# Patient Record
Sex: FEMALE | Race: ASIAN | NOT HISPANIC OR LATINO | URBAN - METROPOLITAN AREA
[De-identification: names, ages, dates, MRNs, and addresses within clinical notes are randomized per-mention and may not be internally consistent; named-entity substitution may affect disease eponyms.]

---

## 2019-05-26 ENCOUNTER — EMERGENCY (EMERGENCY)
Facility: HOSPITAL | Age: 19
LOS: 1 days | Discharge: HOME | End: 2019-05-26
Attending: EMERGENCY MEDICINE | Admitting: EMERGENCY MEDICINE
Payer: MEDICAID

## 2019-05-26 VITALS
DIASTOLIC BLOOD PRESSURE: 86 MMHG | HEART RATE: 81 BPM | OXYGEN SATURATION: 100 % | RESPIRATION RATE: 18 BRPM | SYSTOLIC BLOOD PRESSURE: 130 MMHG | TEMPERATURE: 99 F

## 2019-05-26 VITALS
DIASTOLIC BLOOD PRESSURE: 61 MMHG | RESPIRATION RATE: 18 BRPM | OXYGEN SATURATION: 100 % | TEMPERATURE: 98 F | SYSTOLIC BLOOD PRESSURE: 94 MMHG | HEART RATE: 78 BPM

## 2019-05-26 LAB
ALBUMIN SERPL ELPH-MCNC: 4.3 G/DL — SIGNIFICANT CHANGE UP (ref 3.3–5)
ALP SERPL-CCNC: 30 U/L — LOW (ref 40–120)
ALT FLD-CCNC: 4 U/L — SIGNIFICANT CHANGE UP (ref 4–33)
ANION GAP SERPL CALC-SCNC: 14 MMO/L — SIGNIFICANT CHANGE UP (ref 7–14)
APPEARANCE UR: SIGNIFICANT CHANGE UP
AST SERPL-CCNC: 16 U/L — SIGNIFICANT CHANGE UP (ref 4–32)
BASOPHILS # BLD AUTO: 0.03 K/UL — SIGNIFICANT CHANGE UP (ref 0–0.2)
BASOPHILS NFR BLD AUTO: 0.4 % — SIGNIFICANT CHANGE UP (ref 0–2)
BILIRUB SERPL-MCNC: 0.3 MG/DL — SIGNIFICANT CHANGE UP (ref 0.2–1.2)
BILIRUB UR-MCNC: NEGATIVE — SIGNIFICANT CHANGE UP
BLD GP AB SCN SERPL QL: NEGATIVE — SIGNIFICANT CHANGE UP
BLOOD UR QL VISUAL: NEGATIVE — SIGNIFICANT CHANGE UP
BUN SERPL-MCNC: 5 MG/DL — LOW (ref 7–23)
CALCIUM SERPL-MCNC: 9.7 MG/DL — SIGNIFICANT CHANGE UP (ref 8.4–10.5)
CHLORIDE SERPL-SCNC: 104 MMOL/L — SIGNIFICANT CHANGE UP (ref 98–107)
CO2 SERPL-SCNC: 22 MMOL/L — SIGNIFICANT CHANGE UP (ref 22–31)
COLOR SPEC: YELLOW — SIGNIFICANT CHANGE UP
CREAT SERPL-MCNC: 0.54 MG/DL — SIGNIFICANT CHANGE UP (ref 0.5–1.3)
EOSINOPHIL # BLD AUTO: 0.04 K/UL — SIGNIFICANT CHANGE UP (ref 0–0.5)
EOSINOPHIL NFR BLD AUTO: 0.5 % — SIGNIFICANT CHANGE UP (ref 0–6)
GLUCOSE SERPL-MCNC: 79 MG/DL — SIGNIFICANT CHANGE UP (ref 70–99)
GLUCOSE UR-MCNC: NEGATIVE — SIGNIFICANT CHANGE UP
HCG SERPL-ACNC: SIGNIFICANT CHANGE UP MIU/ML
HCT VFR BLD CALC: 38.4 % — SIGNIFICANT CHANGE UP (ref 34.5–45)
HGB BLD-MCNC: 12.4 G/DL — SIGNIFICANT CHANGE UP (ref 11.5–15.5)
IMM GRANULOCYTES NFR BLD AUTO: 0.4 % — SIGNIFICANT CHANGE UP (ref 0–1.5)
KETONES UR-MCNC: NEGATIVE — SIGNIFICANT CHANGE UP
LEUKOCYTE ESTERASE UR-ACNC: NEGATIVE — SIGNIFICANT CHANGE UP
LG PLATELETS BLD QL AUTO: SLIGHT — SIGNIFICANT CHANGE UP
LYMPHOCYTES # BLD AUTO: 1.67 K/UL — SIGNIFICANT CHANGE UP (ref 1–3.3)
LYMPHOCYTES # BLD AUTO: 22.8 % — SIGNIFICANT CHANGE UP (ref 13–44)
MCHC RBC-ENTMCNC: 27.8 PG — SIGNIFICANT CHANGE UP (ref 27–34)
MCHC RBC-ENTMCNC: 32.3 % — SIGNIFICANT CHANGE UP (ref 32–36)
MCV RBC AUTO: 86.1 FL — SIGNIFICANT CHANGE UP (ref 80–100)
MONOCYTES # BLD AUTO: 0.59 K/UL — SIGNIFICANT CHANGE UP (ref 0–0.9)
MONOCYTES NFR BLD AUTO: 8 % — SIGNIFICANT CHANGE UP (ref 2–14)
MUCOUS THREADS # UR AUTO: SIGNIFICANT CHANGE UP
NEUTROPHILS # BLD AUTO: 4.97 K/UL — SIGNIFICANT CHANGE UP (ref 1.8–7.4)
NEUTROPHILS NFR BLD AUTO: 67.9 % — SIGNIFICANT CHANGE UP (ref 43–77)
NITRITE UR-MCNC: NEGATIVE — SIGNIFICANT CHANGE UP
NRBC # FLD: 0.02 K/UL — SIGNIFICANT CHANGE UP (ref 0–0)
PH UR: 8 — SIGNIFICANT CHANGE UP (ref 5–8)
PLATELET # BLD AUTO: 99 K/UL — LOW (ref 150–400)
PLATELET CLUMP BLD QL SMEAR: SLIGHT — SIGNIFICANT CHANGE UP
PLATELET COUNT - ESTIMATE: SIGNIFICANT CHANGE UP
PMV BLD: SIGNIFICANT CHANGE UP FL (ref 7–13)
POTASSIUM SERPL-MCNC: 4.2 MMOL/L — SIGNIFICANT CHANGE UP (ref 3.5–5.3)
POTASSIUM SERPL-SCNC: 4.2 MMOL/L — SIGNIFICANT CHANGE UP (ref 3.5–5.3)
PROT SERPL-MCNC: 7.5 G/DL — SIGNIFICANT CHANGE UP (ref 6–8.3)
PROT UR-MCNC: 30 — SIGNIFICANT CHANGE UP
RBC # BLD: 4.46 M/UL — SIGNIFICANT CHANGE UP (ref 3.8–5.2)
RBC # FLD: 17.4 % — HIGH (ref 10.3–14.5)
RH IG SCN BLD-IMP: POSITIVE — SIGNIFICANT CHANGE UP
SODIUM SERPL-SCNC: 140 MMOL/L — SIGNIFICANT CHANGE UP (ref 135–145)
SP GR SPEC: 1.02 — SIGNIFICANT CHANGE UP (ref 1–1.04)
UROBILINOGEN FLD QL: NORMAL — SIGNIFICANT CHANGE UP
WBC # BLD: 7.33 K/UL — SIGNIFICANT CHANGE UP (ref 3.8–10.5)
WBC # FLD AUTO: 7.33 K/UL — SIGNIFICANT CHANGE UP (ref 3.8–10.5)

## 2019-05-26 PROCEDURE — 99284 EMERGENCY DEPT VISIT MOD MDM: CPT

## 2019-05-26 PROCEDURE — 76830 TRANSVAGINAL US NON-OB: CPT | Mod: 26

## 2019-05-26 NOTE — ED ADULT TRIAGE NOTE - CHIEF COMPLAINT QUOTE
c/o lower abd pain radiating to the back onset 2 days, denies N/V fever or chills, denies burning on urination, 7 weeks pregnant, , denies spotting or vag bleeding.

## 2019-05-26 NOTE — ED PROVIDER NOTE - OBJECTIVE STATEMENT
17:57 Augie att: 19 @ 7 weeks c/o abd pain x 4 days. Past four days suprapubic abd cramping, constnat, rad to back. Constant nausea, infrequent nbnb emesis. Denies f, c, d, dysuria, vag bleed. OB Meena Nath

## 2019-05-28 LAB
BACTERIA UR CULT: SIGNIFICANT CHANGE UP
SPECIMEN SOURCE: SIGNIFICANT CHANGE UP

## 2020-01-07 ENCOUNTER — INPATIENT (INPATIENT)
Facility: HOSPITAL | Age: 20
LOS: 2 days | Discharge: HOME | End: 2020-01-10
Attending: SPECIALIST | Admitting: SPECIALIST
Payer: MEDICAID

## 2020-01-07 VITALS
OXYGEN SATURATION: 97 % | TEMPERATURE: 99 F | HEART RATE: 87 BPM | SYSTOLIC BLOOD PRESSURE: 127 MMHG | DIASTOLIC BLOOD PRESSURE: 70 MMHG | RESPIRATION RATE: 16 BRPM | HEIGHT: 55 IN | WEIGHT: 158.73 LBS

## 2020-01-07 DIAGNOSIS — O48.0 POST-TERM PREGNANCY: ICD-10-CM

## 2020-01-07 PROBLEM — Z78.9 OTHER SPECIFIED HEALTH STATUS: Chronic | Status: ACTIVE | Noted: 2019-05-26

## 2020-01-07 LAB
APTT BLD: 26.3 SEC — LOW (ref 27.5–36.3)
BASOPHILS # BLD AUTO: 0.02 K/UL — SIGNIFICANT CHANGE UP (ref 0–0.2)
BASOPHILS NFR BLD AUTO: 0.3 % — SIGNIFICANT CHANGE UP (ref 0–2)
BLD GP AB SCN SERPL QL: NEGATIVE — SIGNIFICANT CHANGE UP
EOSINOPHIL # BLD AUTO: 0.04 K/UL — SIGNIFICANT CHANGE UP (ref 0–0.5)
EOSINOPHIL NFR BLD AUTO: 0.6 % — SIGNIFICANT CHANGE UP (ref 0–6)
HCT VFR BLD CALC: 38 % — SIGNIFICANT CHANGE UP (ref 34.5–45)
HGB BLD-MCNC: 12.3 G/DL — SIGNIFICANT CHANGE UP (ref 11.5–15.5)
IMM GRANULOCYTES NFR BLD AUTO: 1.1 % — SIGNIFICANT CHANGE UP (ref 0–1.5)
INR BLD: 0.98 — SIGNIFICANT CHANGE UP (ref 0.88–1.17)
LYMPHOCYTES # BLD AUTO: 1.54 K/UL — SIGNIFICANT CHANGE UP (ref 1–3.3)
LYMPHOCYTES # BLD AUTO: 21.3 % — SIGNIFICANT CHANGE UP (ref 13–44)
MCHC RBC-ENTMCNC: 29.4 PG — SIGNIFICANT CHANGE UP (ref 27–34)
MCHC RBC-ENTMCNC: 32.4 % — SIGNIFICANT CHANGE UP (ref 32–36)
MCV RBC AUTO: 90.9 FL — SIGNIFICANT CHANGE UP (ref 80–100)
MONOCYTES # BLD AUTO: 0.74 K/UL — SIGNIFICANT CHANGE UP (ref 0–0.9)
MONOCYTES NFR BLD AUTO: 10.2 % — SIGNIFICANT CHANGE UP (ref 2–14)
NEUTROPHILS # BLD AUTO: 4.81 K/UL — SIGNIFICANT CHANGE UP (ref 1.8–7.4)
NEUTROPHILS NFR BLD AUTO: 66.5 % — SIGNIFICANT CHANGE UP (ref 43–77)
NRBC # FLD: 0 K/UL — SIGNIFICANT CHANGE UP (ref 0–0)
PLATELET # BLD AUTO: 64 K/UL — LOW (ref 150–400)
PMV BLD: SIGNIFICANT CHANGE UP FL (ref 7–13)
PROTHROM AB SERPL-ACNC: 11.2 SEC — SIGNIFICANT CHANGE UP (ref 9.8–13.1)
RBC # BLD: 4.18 M/UL — SIGNIFICANT CHANGE UP (ref 3.8–5.2)
RBC # FLD: 18.3 % — HIGH (ref 10.3–14.5)
REVIEW TO FOLLOW: YES — SIGNIFICANT CHANGE UP
RH IG SCN BLD-IMP: POSITIVE — SIGNIFICANT CHANGE UP
WBC # BLD: 7.23 K/UL — SIGNIFICANT CHANGE UP (ref 3.8–10.5)
WBC # FLD AUTO: 7.23 K/UL — SIGNIFICANT CHANGE UP (ref 3.8–10.5)

## 2020-01-07 RX ORDER — CITRIC ACID/SODIUM CITRATE 300-500 MG
15 SOLUTION, ORAL ORAL EVERY 6 HOURS
Refills: 0 | Status: DISCONTINUED | OUTPATIENT
Start: 2020-01-07 | End: 2020-01-08

## 2020-01-07 RX ORDER — OXYTOCIN 10 UNIT/ML
333.33 VIAL (ML) INJECTION
Qty: 20 | Refills: 0 | Status: DISCONTINUED | OUTPATIENT
Start: 2020-01-07 | End: 2020-01-08

## 2020-01-07 RX ORDER — HYDROCORTISONE 20 MG
50 TABLET ORAL EVERY 8 HOURS
Refills: 0 | Status: DISCONTINUED | OUTPATIENT
Start: 2020-01-07 | End: 2020-01-08

## 2020-01-07 RX ORDER — AMPICILLIN TRIHYDRATE 250 MG
2 CAPSULE ORAL ONCE
Refills: 0 | Status: COMPLETED | OUTPATIENT
Start: 2020-01-07 | End: 2020-01-07

## 2020-01-07 RX ORDER — AMPICILLIN TRIHYDRATE 250 MG
1 CAPSULE ORAL EVERY 4 HOURS
Refills: 0 | Status: DISCONTINUED | OUTPATIENT
Start: 2020-01-07 | End: 2020-01-08

## 2020-01-07 RX ORDER — SODIUM CHLORIDE 9 MG/ML
1000 INJECTION, SOLUTION INTRAVENOUS
Refills: 0 | Status: DISCONTINUED | OUTPATIENT
Start: 2020-01-07 | End: 2020-01-08

## 2020-01-07 RX ADMIN — Medication 216 GRAM(S): at 14:59

## 2020-01-07 RX ADMIN — Medication 108 GRAM(S): at 23:54

## 2020-01-07 RX ADMIN — Medication 50 MILLIGRAM(S): at 21:44

## 2020-01-07 RX ADMIN — Medication 108 GRAM(S): at 19:36

## 2020-01-07 NOTE — OB PROVIDER IHI INDUCTION/AUGMENTATION NOTE - NS_CHECKALL_OBGYN_ALL_OB
H&P was completed/Contractions pattern was reviewed/Order was written/FHR was reviewed H&P was completed/Order was written/FHR was reviewed/Induction / Augmentation was discussed/Contractions pattern was reviewed

## 2020-01-07 NOTE — CHART NOTE - NSCHARTNOTEFT_GEN_A_CORE
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 Gustabo Blanco in [place] presents for planned induction.  Platelets today 64.  Patient was on 60 mg of prednisone daily.  Patient has already started induction, she may not deliver until tomorrow per OB but timing is unclear as it is her first baby.  Would check with anesthesia threshold for epidural.  However, IVIG or even stress dose steroids unlikely to raise platelets within 24 hours.  Would continue on oral prednisone throughout peripartum period for hx of ITP.  Full consult to follow in AM.  Please inform Dr. Blanco of patient's admission and above plan.    Discussed with Dr. Chaudhry, on call attending.    Kristine Lentz DO  Hematology/Oncology Fellow, PGY5  Pager: [phone]

## 2020-01-07 NOTE — OB PROVIDER H&P - HISTORY OF PRESENT ILLNESS
Pt is a 20y/o  EDC 19     at 41 weeks presenting to L&D for inductions of labor. Patient denies contractions, leaking fluid, vaginal bleeding, endorses good fetal movement. Prenatal course complicated ITP heme Dr. Avila has been on a prednisone. GBS pos. EFW 3200grams. Pt accepts all blood products.     Allergies:NKDA      Medhx: pt with ITP followed by Dr. Aristides Avila hematology   Obhx: current, SABx2  Gynhx: pt denies cysts, fibroids, abn paps,stds  Sxhx: pt denies  Psychx: pt denies  Sochx: pt denies etoh, tobacco, and drug use  Famhx: pt denies    PE:  T(C): --  HR: --  BP: --  RR: --  SpO2: --  Abd: gravid soft nontender  CV: rrr  VE:60/-3  Sono: cephalic presentation    A+P: pt is a P0 at 41+weeks presenting for IOL; GBS+, ITP  -admit to L&D  -amp for gbs ppx  -routine labs  -efm/toco  -PO cytotec  -IV hydration  -bicitra  -anesthesia consult    d/w Dr. Pham PETERS-[facility] Pt is a 20y/o  EDC 19     at 41 weeks presenting to L&D for inductions of labor. Patient denies contractions, leaking fluid, vaginal bleeding, endorses good fetal movement. Prenatal course complicated ITP heme Dr. Avila has been on a prednisone x 3 weeks. GBS pos. EFW 3200grams. Pt accepts all blood products.     Allergies:NKDA  Meds: Prednisone 60mg daily, pnv, fe      Medhx: pt with ITP followed by Dr. Aristides Avila hematology - placed on prednisone 3 weeks ago now on 60mg daily  Obhx: current, SABx2  Gynhx: pt denies cysts, fibroids, abn paps,stds  Sxhx: pt denies  Psychx: pt denies  Sochx: pt denies etoh, tobacco, and drug use  Famhx: pt denies    PE:  T(C): --  HR: --  BP: --  RR: --  SpO2: --  Abd: gravid soft nontender  CV: rrr  VE:/-3  Sono: cephalic presentation    A+P: pt is a P0 at 41+weeks presenting for IOL; GBS+, ITP  -admit to L&D  -amp for gbs ppx  -routine labs  -efm/toco  -PO cytotec  -IV hydration  -bicitra  -anesthesia consult    d/w Dr. Pham [facility]ison [facility]

## 2020-01-08 LAB
HBV SURFACE AG SER-ACNC: NEGATIVE — SIGNIFICANT CHANGE UP
HCT VFR BLD CALC: 41.9 % — SIGNIFICANT CHANGE UP (ref 34.5–45)
HGB BLD-MCNC: 13.2 G/DL — SIGNIFICANT CHANGE UP (ref 11.5–15.5)
HIV COMBO RESULT: SIGNIFICANT CHANGE UP
HIV1+2 AB SPEC QL: SIGNIFICANT CHANGE UP
MCHC RBC-ENTMCNC: 28.7 PG — SIGNIFICANT CHANGE UP (ref 27–34)
MCHC RBC-ENTMCNC: 31.5 % — LOW (ref 32–36)
MCV RBC AUTO: 91.1 FL — SIGNIFICANT CHANGE UP (ref 80–100)
NRBC # FLD: 0 K/UL — SIGNIFICANT CHANGE UP (ref 0–0)
PLATELET # BLD AUTO: 66 K/UL — LOW (ref 150–400)
PMV BLD: SIGNIFICANT CHANGE UP FL (ref 7–13)
RBC # BLD: 4.6 M/UL — SIGNIFICANT CHANGE UP (ref 3.8–5.2)
RBC # FLD: 18.6 % — HIGH (ref 10.3–14.5)
RUBV IGG SER-ACNC: 2.9 INDEX — SIGNIFICANT CHANGE UP
RUBV IGG SER-IMP: POSITIVE — SIGNIFICANT CHANGE UP
T PALLIDUM AB TITR SER: NEGATIVE — SIGNIFICANT CHANGE UP
T PALLIDUM AB TITR SER: NEGATIVE — SIGNIFICANT CHANGE UP
WBC # BLD: 8.5 K/UL — SIGNIFICANT CHANGE UP (ref 3.8–10.5)
WBC # FLD AUTO: 8.5 K/UL — SIGNIFICANT CHANGE UP (ref 3.8–10.5)

## 2020-01-08 PROCEDURE — 99223 1ST HOSP IP/OBS HIGH 75: CPT

## 2020-01-08 RX ORDER — OXYCODONE HYDROCHLORIDE 5 MG/1
5 TABLET ORAL ONCE
Refills: 0 | Status: DISCONTINUED | OUTPATIENT
Start: 2020-01-08 | End: 2020-01-10

## 2020-01-08 RX ORDER — NALOXONE HYDROCHLORIDE 4 MG/.1ML
0.1 SPRAY NASAL
Refills: 0 | Status: DISCONTINUED | OUTPATIENT
Start: 2020-01-08 | End: 2020-01-08

## 2020-01-08 RX ORDER — BENZOCAINE 10 %
1 GEL (GRAM) MUCOUS MEMBRANE EVERY 6 HOURS
Refills: 0 | Status: DISCONTINUED | OUTPATIENT
Start: 2020-01-08 | End: 2020-01-10

## 2020-01-08 RX ORDER — PRAMOXINE HYDROCHLORIDE 150 MG/15G
1 AEROSOL, FOAM RECTAL EVERY 4 HOURS
Refills: 0 | Status: DISCONTINUED | OUTPATIENT
Start: 2020-01-08 | End: 2020-01-10

## 2020-01-08 RX ORDER — IBUPROFEN 200 MG
600 TABLET ORAL EVERY 6 HOURS
Refills: 0 | Status: COMPLETED | OUTPATIENT
Start: 2020-01-08 | End: 2020-12-06

## 2020-01-08 RX ORDER — DIBUCAINE 1 %
1 OINTMENT (GRAM) RECTAL EVERY 6 HOURS
Refills: 0 | Status: DISCONTINUED | OUTPATIENT
Start: 2020-01-08 | End: 2020-01-10

## 2020-01-08 RX ORDER — KETOROLAC TROMETHAMINE 30 MG/ML
30 SYRINGE (ML) INJECTION ONCE
Refills: 0 | Status: DISCONTINUED | OUTPATIENT
Start: 2020-01-08 | End: 2020-01-08

## 2020-01-08 RX ORDER — GLYCERIN ADULT
1 SUPPOSITORY, RECTAL RECTAL AT BEDTIME
Refills: 0 | Status: DISCONTINUED | OUTPATIENT
Start: 2020-01-08 | End: 2020-01-10

## 2020-01-08 RX ORDER — MAGNESIUM HYDROXIDE 400 MG/1
30 TABLET, CHEWABLE ORAL
Refills: 0 | Status: DISCONTINUED | OUTPATIENT
Start: 2020-01-08 | End: 2020-01-10

## 2020-01-08 RX ORDER — AER TRAVELER 0.5 G/1
1 SOLUTION RECTAL; TOPICAL EVERY 4 HOURS
Refills: 0 | Status: DISCONTINUED | OUTPATIENT
Start: 2020-01-08 | End: 2020-01-10

## 2020-01-08 RX ORDER — SIMETHICONE 80 MG/1
80 TABLET, CHEWABLE ORAL EVERY 4 HOURS
Refills: 0 | Status: DISCONTINUED | OUTPATIENT
Start: 2020-01-08 | End: 2020-01-10

## 2020-01-08 RX ORDER — DIPHENHYDRAMINE HCL 50 MG
25 CAPSULE ORAL EVERY 6 HOURS
Refills: 0 | Status: DISCONTINUED | OUTPATIENT
Start: 2020-01-08 | End: 2020-01-10

## 2020-01-08 RX ORDER — FENTANYL CITRATE 50 UG/ML
30 INJECTION INTRAVENOUS
Refills: 0 | Status: DISCONTINUED | OUTPATIENT
Start: 2020-01-08 | End: 2020-01-08

## 2020-01-08 RX ORDER — SODIUM CHLORIDE 9 MG/ML
3 INJECTION INTRAMUSCULAR; INTRAVENOUS; SUBCUTANEOUS EVERY 8 HOURS
Refills: 0 | Status: DISCONTINUED | OUTPATIENT
Start: 2020-01-08 | End: 2020-01-10

## 2020-01-08 RX ORDER — TETANUS TOXOID, REDUCED DIPHTHERIA TOXOID AND ACELLULAR PERTUSSIS VACCINE, ADSORBED 5; 2.5; 8; 8; 2.5 [IU]/.5ML; [IU]/.5ML; UG/.5ML; UG/.5ML; UG/.5ML
0.5 SUSPENSION INTRAMUSCULAR ONCE
Refills: 0 | Status: DISCONTINUED | OUTPATIENT
Start: 2020-01-08 | End: 2020-01-10

## 2020-01-08 RX ORDER — ACETAMINOPHEN 500 MG
975 TABLET ORAL
Refills: 0 | Status: DISCONTINUED | OUTPATIENT
Start: 2020-01-08 | End: 2020-01-10

## 2020-01-08 RX ORDER — OXYTOCIN 10 UNIT/ML
333.33 VIAL (ML) INJECTION
Qty: 20 | Refills: 0 | Status: DISCONTINUED | OUTPATIENT
Start: 2020-01-08 | End: 2020-01-08

## 2020-01-08 RX ORDER — HYDROCORTISONE 1 %
1 OINTMENT (GRAM) TOPICAL EVERY 6 HOURS
Refills: 0 | Status: DISCONTINUED | OUTPATIENT
Start: 2020-01-08 | End: 2020-01-10

## 2020-01-08 RX ORDER — ONDANSETRON 8 MG/1
4 TABLET, FILM COATED ORAL EVERY 6 HOURS
Refills: 0 | Status: DISCONTINUED | OUTPATIENT
Start: 2020-01-08 | End: 2020-01-10

## 2020-01-08 RX ORDER — OXYCODONE HYDROCHLORIDE 5 MG/1
5 TABLET ORAL
Refills: 0 | Status: DISCONTINUED | OUTPATIENT
Start: 2020-01-08 | End: 2020-01-10

## 2020-01-08 RX ORDER — LANOLIN
1 OINTMENT (GRAM) TOPICAL EVERY 6 HOURS
Refills: 0 | Status: DISCONTINUED | OUTPATIENT
Start: 2020-01-08 | End: 2020-01-10

## 2020-01-08 RX ORDER — IBUPROFEN 200 MG
600 TABLET ORAL EVERY 6 HOURS
Refills: 0 | Status: DISCONTINUED | OUTPATIENT
Start: 2020-01-08 | End: 2020-01-10

## 2020-01-08 RX ADMIN — Medication 975 MILLIGRAM(S): at 11:14

## 2020-01-08 RX ADMIN — SODIUM CHLORIDE 125 MILLILITER(S): 9 INJECTION, SOLUTION INTRAVENOUS at 00:30

## 2020-01-08 RX ADMIN — Medication 1000 MILLIUNIT(S)/MIN: at 06:25

## 2020-01-08 RX ADMIN — SODIUM CHLORIDE 3 MILLILITER(S): 9 INJECTION INTRAMUSCULAR; INTRAVENOUS; SUBCUTANEOUS at 14:00

## 2020-01-08 RX ADMIN — Medication 108 GRAM(S): at 03:56

## 2020-01-08 RX ADMIN — Medication 20 MILLIGRAM(S): at 12:59

## 2020-01-08 RX ADMIN — SODIUM CHLORIDE 3 MILLILITER(S): 9 INJECTION INTRAMUSCULAR; INTRAVENOUS; SUBCUTANEOUS at 22:27

## 2020-01-08 RX ADMIN — Medication 975 MILLIGRAM(S): at 11:44

## 2020-01-08 RX ADMIN — Medication 20 MILLIGRAM(S): at 21:07

## 2020-01-08 RX ADMIN — Medication 50 MILLIGRAM(S): at 06:00

## 2020-01-08 RX ADMIN — Medication 1 TABLET(S): at 11:13

## 2020-01-08 NOTE — CHART NOTE - NSCHARTNOTEFT_GEN_A_CORE
R4 Note    Pt examined for cervical change. SROM@550a.    Vital Signs Last 24 Hrs  T(C): 36.7 (2020 00:40), Max: 37.2 (2020 17:51)  T(F): 98.06 (2020 00:40), Max: 98.9 (2020 17:51)  HR: 95 (2020 05:48) (73 - 100)  BP: 124/74 (2020 05:29) (114/55 - 147/92)  BP(mean): --  RR: 17 (2020 17:51) (16 - 17)  SpO2: 98% (2020 05:48) (96% - 99%)    VE: 9.5/100/0  EFM: 125bl/mod/+accels/occasional variable decels noted (though pt sitting on room floor at this time)  Randall: q1-2min    IOL for LT, c/b ITP  -anticipate   -stress dose steroids  -efm/toco    d/w Dr. Henrry Rodriguez, pgy4

## 2020-01-08 NOTE — CONSULT NOTE ADULT - ASSESSMENT
19F 41 weeks pregnant present for induction of labor. Her  course has been complicated by ITP per documentation and she is on prednisone. Her current platelet count is in the 60s which is adequate for both vaginal and  delivery.    ITP  - c/w prednisone 60mg daily peripartum  - continue to check daily platelet count  - monitor for any signs or symptoms of bleeding, can transfuse PLT if this occurs and can consider IVIG at that time (if platelet count falls)  - if platelets count falls further can given IVIG, but this can take 24 hours to exert its effects  - typically a count > 75-80k is required for epidural placement (defer to anesthesia regarding our institutional guidelines), unfortunately IVIG would not raise the platelet count quickly enough for this to be safely placed at this time.   - coags wnl arguing against DIC, would check LFTs  - will attempt to discuss with patient's hematologist for more collateral    Adonay Ly, PGY6  Hematology Fellow  Pager: [phone] 19F 41 weeks pregnant present for induction of labor. Her  course has been complicated by ITP per documentation and she is on prednisone. Her current platelet count is in the 60s which is adequate for both vaginal and  delivery.    ITP  - c/w prednisone 60mg daily peripartum  - continue to check daily platelet count  - monitor for any signs or symptoms of bleeding. If patient requires  delivery and platelet count falls below 50k, can transfuse PLT will plan on administering IVIG at that time  - typically a count > 75-80k is required for epidural placement (defer to anesthesia regarding our institutional guidelines), unfortunately IVIG would not raise the platelet count quickly enough for this to be safely placed at this time.   - coags wnl arguing against DIC, would check LFTs  - will attempt to discuss with patient's hematologist for more collateral    Adonay Ly, PGY6  Hematology Fellow  Pager: [phone] 19F 41 weeks pregnant present for induction of labor. Her  course has been complicated by ITP per documentation and she is on prednisone. Her current platelet count is in the 60s which is adequate for both vaginal and  delivery.    ITP  - c/w prednisone 60mg daily peripartum  - continue to check daily platelet count  - monitor for any signs or symptoms of bleeding. If patient requires  delivery and platelet count falls below 50k, can transfuse PLT will plan on administering IVIG at that time  - typically a count > 75-80k is required for epidural placement (defer to anesthesia regarding our institutional guidelines), unfortunately IVIG would not raise the platelet count quickly enough for this to be safely placed at this time.   - coags wnl arguing against DIC, would check LFTs  - will attempt to discuss with patient's hematologist for more collateral  - anti glycoprotein antibodies can cross the placenta and could induce  thrombocytopenia and  platelet levels should be measured after birth and pediatrics should be made aware of this so that they can monitor per their protocol      Adonay Ly, PGY6  Hematology Fellow  Pager: [phone] 19F 41 weeks pregnant present for induction of labor. Her  course has been complicated by ITP per documentation and she is on prednisone. Her current platelet count is in the 60s which is adequate for both vaginal and  delivery.    ITP  - c/w prednisone per her outpatient hematologist's recommendations  - continue to check daily platelet count  - Patient had spontaneous uncomplicated vaginal delivery. Monitor for any excess bleeding.  - unfortunately due to her thrombocytopenia, she was high risk for epidural placement (typically PLT count of 75-80k is required) and unfortunately IVIG would not have raised the platelets quickly enough  - coags wnl arguing against DIC, would check LFTs for completeness  - attempted to reach patient's hematologist for collateral without success, primary team appears to be communicating with him  - anti glycoprotein antibodies can cross the placenta and could induce  thrombocytopenia and  platelet levels should be measured after birth and pediatrics should be made aware of this so that they can monitor per their protocol    Adonay Ly, PGY6  Hematology Fellow  Pager: [phone]

## 2020-01-08 NOTE — LACTATION INITIAL EVALUATION - LACTATION INTERVENTIONS
Pt. with hx of gestational ITP. Had concerns regarding taking prednisone while breastfeeding. Pt. with hx of gestational ITP. Had concerns regarding taking prednisone while breastfeeding. Pt. and RN informed and educated with regard to L2 status of medication. No contraindications indicated by [employer]. Decision to be made by pt. and peds based on information provided. Infant less than 24 hours old. Mom educated with regard to 1) babies less than 24 hours of age will be sleepy. 2) Made aware of cluster feeding that occurs after 24 hours of life and to be cautious of sleep deprivation in order to maintain infant and mother safety. Instructed to place infant in bassinet or call for assistance if feeling sleepy or tired. 3) Recognition of feeding cues and to feed the baby on demand based on cues at least 8-12 times in a day. Instructed pt. to wake the baby to feed if no feeding cues are seen within 3h since prior feed. 4) use  feeding log to record feedings along with wet and dirty diapers. Pt. informed of the accessibility of breastfeeding apps to document feedings along with wet and dirty diapers as another alternative for paper log. 5) instructed in hand expression with good return demonstration. + colostrum noted. Pt. informed of interactive learning luis available to use with the New Beginnings book. Interactive component demonstrated utilizing the section for hand expression. 6) Reviewed safe skin to skin. Shown wall poster for visual reinforcement of education. 7) Reviewed risks of supplementation. Verbalized understanding of education. Pt. informed of availability of lactation through the night and encouraged to call for assistance prn. RN made aware of plan and to assist with further feedings as necessary. Instructed to request assistance prn.

## 2020-01-08 NOTE — CHART NOTE - NSCHARTNOTEFT_GEN_A_CORE
Patient evaluated bedside for increased pain with contractions. Patient desires something for pain control.     T(C): 36.7 (00:40)  HR: 83 (05:06)  BP: 124/79 (05:06)  RR: 17 (17:51)  SpO2: 96% (00:19)    SVE: 5-6/90/-1  EFM: 125 BPM, moderate variability, possible decels versus maternal FH  [place]:  CTX q1-3min    A/P: Patient is a 20yo  at 41-1 ITP IOL. PLT 66. Unable to offer epidural 2/2 thrombocytopenia. For Anesthesia consult for PCA for pain control pending tracing.     Demetria Meehan, PGY2  D/W Dr. Sales

## 2020-01-08 NOTE — CHART NOTE - NSCHARTNOTEFT_GEN_A_CORE
Called patient's doctor,  to discuss plan of care for prednisone in the postpartum setting.   Patient to continue Prednisone 10 mg, 5 times a day. Patient to follow up with  on Monday, 1/13/2019 to decide further management.  Will communicate to RN and OBGYN resident team.     's cell number:

## 2020-01-08 NOTE — OB PROVIDER DELIVERY SUMMARY - NSPROVIDERDELIVERYNOTE_OBGYN_ALL_OB_FT
Spontaneous vaginal delivery of liveborn male infant from FREDA position. Head, shoulders, and body delivered easily. Infant was suctioned. Cord was clamped and cut and infant was passed to peds. Placenta delivered intact with a 3 vessel cord. Fundal massage was given and uterine fundus was found to be firm. Vaginal exam revealed an intact cervix, vaginal walls and sulci. Patient had a 2nd degree laceration in the perineum that was repaired with 2.0 chromic sutures. Excellent hemostasis was noted. Patient was stable and went to recovery. Count was correct x 2.

## 2020-01-08 NOTE — CONSULT NOTE ADULT - ATTENDING COMMENTS
Patient seen and examined, agree with above. Thrombocytopenia in pregnancy likely from ITP on steroids, successful vaginal delivery. We have recommended to check the baby's platelets as well as a very small percentage of babies born to mothers with ITP may present with thrombocytopenia at birth. Patient has scheduled a f/u with regular hematologist upon discharge.

## 2020-01-08 NOTE — OB RN DELIVERY SUMMARY - NS_SEPSISRSKCALC_OBGYN_ALL_OB_FT
No temperature has been documented for this patient in CPN or on the OB Flowsheet. Ensure the highest temperature during labor was documented on the OB Flowsheet.  No gestational age at birth has been documented. Ensure delivery date/time has been entered above.  Rupture of membranes must be entered above. EOS calculated successfully. EOS Risk Factor: 0.5/1000 live births ([facility] national incidence); GA=41w1d; Temp=98.8; ROM=0.6; GBS='Positive'; Antibiotics='Broad spectrum antibiotics > 4 hrs prior to birth'

## 2020-01-08 NOTE — CONSULT NOTE ADULT - SUBJECTIVE AND OBJECTIVE BOX
HPI:    Patient is a 19 year old female 41 weeks pregnant who presents for induction of labor. Her  course was complicated by ITP per EHR. She sees Dr. Avila, who does not come to [facility]. Patient has been on prednisone 60mg daily for the last 3 weeks. Here her platelet count is 64, with repeat measurement of 66. The patient accepts blood product transfusion as needed. She denies any bleeding or bruising. Her only other medication is a prenatal vitamin.      Allergies    No Known Allergies    Intolerances        MEDICATIONS  (STANDING):  acetaminophen   Tablet .. 975 milliGRAM(s) Oral <User Schedule>  diphtheria/tetanus/pertussis (acellular) Vaccine (ADAcel) 0.5 milliLiter(s) IntraMuscular once  fentaNYL PCA (50 MICROgram(s)/mL) 30 milliLiter(s) PCA Continuous PCA Continuous  hydrocortisone sodium succinate Injectable 50 milliGRAM(s) IV Push every 8 hours  ibuprofen  Tablet. 600 milliGRAM(s) Oral every 6 hours  ketorolac   Injectable 30 milliGRAM(s) IV Push once  oxytocin Infusion 333.333 milliUNIT(s)/Min (1000 mL/Hr) IV Continuous <Continuous>  prenatal multivitamin 1 Tablet(s) Oral daily  sodium chloride 0.9% lock flush 3 milliLiter(s) IV Push every 8 hours    MEDICATIONS  (PRN):  benzocaine 20%/menthol 0.5% Spray 1 Spray(s) Topical every 6 hours PRN for Perineal discomfort  dibucaine 1% Ointment 1 Application(s) Topical every 6 hours PRN Perineal discomfort  diphenhydrAMINE 25 milliGRAM(s) Oral every 6 hours PRN Pruritus  glycerin Suppository - Adult 1 Suppository(s) Rectal at bedtime PRN Constipation  hydrocortisone 1% Cream 1 Application(s) Topical every 6 hours PRN Moderate Pain (4-6)  lanolin Ointment 1 Application(s) Topical every 6 hours PRN nipple soreness  magnesium hydroxide Suspension 30 milliLiter(s) Oral two times a day PRN Constipation  naloxone Injectable 0.1 milliGRAM(s) IV Push every 3 minutes PRN For ANY of the following changes in patient status:  A. RR LESS THAN 10 breaths per minute, B. Oxygen saturation LESS THAN 90%, C. Sedation score of 6  ondansetron Injectable 4 milliGRAM(s) IV Push every 6 hours PRN Nausea  oxyCODONE    IR 5 milliGRAM(s) Oral every 3 hours PRN Moderate to Severe Pain (4-10)  oxyCODONE    IR 5 milliGRAM(s) Oral once PRN Moderate to Severe Pain (4-10)  pramoxine 1%/zinc 5% Cream 1 Application(s) Topical every 4 hours PRN Moderate Pain (4-6)  simethicone 80 milliGRAM(s) Chew every 4 hours PRN Gas  witch hazel Pads 1 Application(s) Topical every 4 hours PRN Perineal discomfort      PAST MEDICAL & SURGICAL HISTORY:  Thrombocytopenia during pregnancy  No pertinent past medical history  No significant past surgical history      FAMILY HISTORY:  No pertinent family history in first degree relatives      SOCIAL HISTORY: No EtOH, no tobacco    REVIEW OF SYSTEMS:    CONSTITUTIONAL: No weakness, fevers or chills  EYES/ENT: No visual changes;  No vertigo or throat pain   NECK: No pain or stiffness  RESPIRATORY: No cough, wheezing, hemoptysis; No shortness of breath  CARDIOVASCULAR: No chest pain or palpitations  GASTROINTESTINAL: No abdominal or epigastric pain. No nausea, vomiting, or hematemesis; No diarrhea or constipation. No melena or hematochezia.  GENITOURINARY: No dysuria, frequency or hematuria  NEUROLOGICAL: No numbness or weakness  SKIN: No itching, burning, rashes, or lesions   All other review of systems is negative unless indicated above.    Height (cm): 2 ( @ :04)  Weight (kg): 72 ( @ :04)  BMI (kg/m2): 998271 ( @ :04)  BSA (m2): 0.07 ( @ :04)    T(F): 98.8 (20 @ 06:44), Max: 98.9 (20 @ 17:51)  HR: 120 (20 @ 07:43)  BP: 99/57 (20 @ 07:43)  RR: 17 (20 @ 17:51)  SpO2: 89% (20 @ 07:41)  Wt(kg): --    GENERAL: NAD, well-developed  HEAD:  Atraumatic, Normocephalic  EYES: EOMI, PERRLA, conjunctiva and sclera clear  NECK: Supple, No JVD  CHEST/LUNG: Clear to auscultation bilaterally; No wheeze  HEART: Regular rate and rhythm; No murmurs, rubs, or gallops  ABDOMEN: Soft, Nontender, Nondistended; Bowel sounds present  EXTREMITIES:  2+ Peripheral Pulses, No clubbing, cyanosis, or edema  NEUROLOGY: non-focal  SKIN: No rashes or lesions                          13.2   8.50  )-----------( 66       ( 2020 01:20 )             41.9 HPI:    Patient is a 19 year old female 41 weeks pregnant who presents for induction of labor. Her  course was complicated by ITP per EHR. She sees Dr. Avila, who does not come to [facility]. Patient has been on prednisone 60mg daily for the last 3 weeks. Here her platelet count is 64, with repeat measurement of 66. The patient accepts blood product transfusion as needed. She denies any bleeding or bruising. Her only other medication is a prenatal vitamin.    At the time of my evaluation the patient had already had a successful vaginal delivery. The team has spoken to the patient's hematologist who arranged follow up on Monday and the patient will continue with prednisone 50mg daily. I attempted to call the patients hematologist for collateral information.      Allergies    No Known Allergies    Intolerances        MEDICATIONS  (STANDING):  acetaminophen   Tablet .. 975 milliGRAM(s) Oral <User Schedule>  diphtheria/tetanus/pertussis (acellular) Vaccine (ADAcel) 0.5 milliLiter(s) IntraMuscular once  fentaNYL PCA (50 MICROgram(s)/mL) 30 milliLiter(s) PCA Continuous PCA Continuous  hydrocortisone sodium succinate Injectable 50 milliGRAM(s) IV Push every 8 hours  ibuprofen  Tablet. 600 milliGRAM(s) Oral every 6 hours  ketorolac   Injectable 30 milliGRAM(s) IV Push once  oxytocin Infusion 333.333 milliUNIT(s)/Min (1000 mL/Hr) IV Continuous <Continuous>  prenatal multivitamin 1 Tablet(s) Oral daily  sodium chloride 0.9% lock flush 3 milliLiter(s) IV Push every 8 hours    MEDICATIONS  (PRN):  benzocaine 20%/menthol 0.5% Spray 1 Spray(s) Topical every 6 hours PRN for Perineal discomfort  dibucaine 1% Ointment 1 Application(s) Topical every 6 hours PRN Perineal discomfort  diphenhydrAMINE 25 milliGRAM(s) Oral every 6 hours PRN Pruritus  glycerin Suppository - Adult 1 Suppository(s) Rectal at bedtime PRN Constipation  hydrocortisone 1% Cream 1 Application(s) Topical every 6 hours PRN Moderate Pain (4-6)  lanolin Ointment 1 Application(s) Topical every 6 hours PRN nipple soreness  magnesium hydroxide Suspension 30 milliLiter(s) Oral two times a day PRN Constipation  naloxone Injectable 0.1 milliGRAM(s) IV Push every 3 minutes PRN For ANY of the following changes in patient status:  A. RR LESS THAN 10 breaths per minute, B. Oxygen saturation LESS THAN 90%, C. Sedation score of 6  ondansetron Injectable 4 milliGRAM(s) IV Push every 6 hours PRN Nausea  oxyCODONE    IR 5 milliGRAM(s) Oral every 3 hours PRN Moderate to Severe Pain (4-10)  oxyCODONE    IR 5 milliGRAM(s) Oral once PRN Moderate to Severe Pain (4-10)  pramoxine 1%/zinc 5% Cream 1 Application(s) Topical every 4 hours PRN Moderate Pain (4-6)  simethicone 80 milliGRAM(s) Chew every 4 hours PRN Gas  witch hazel Pads 1 Application(s) Topical every 4 hours PRN Perineal discomfort      PAST MEDICAL & SURGICAL HISTORY:  Thrombocytopenia during pregnancy  No pertinent past medical history  No significant past surgical history      FAMILY HISTORY:  No pertinent family history in first degree relatives      SOCIAL HISTORY: No EtOH, no tobacco    REVIEW OF SYSTEMS:    CONSTITUTIONAL: No weakness, fevers or chills  EYES/ENT: No visual changes;  No vertigo or throat pain   NECK: No pain or stiffness  RESPIRATORY: No cough, wheezing, hemoptysis; No shortness of breath  CARDIOVASCULAR: No chest pain or palpitations  GASTROINTESTINAL: No abdominal or epigastric pain. No nausea, vomiting, or hematemesis; No diarrhea or constipation. No melena or hematochezia.  GENITOURINARY: No dysuria, frequency or hematuria  NEUROLOGICAL: No numbness or weakness  SKIN: No itching, burning, rashes, or lesions   All other review of systems is negative unless indicated above.    Height (cm): 2 (:)  Weight (kg): 72 (:)  BMI (kg/m2): 359547 (:)  BSA (m2): 0.07 (:)    T(F): 98.8 (20 @ 06:44), Max: 98.9 (20 @ 17:51)  HR: 120 (20 @ 07:43)  BP: 99/57 (20 07:43)  RR: 17 (20 @ 17:51)  SpO2: 89% (01-08-20 @ 07:41)  Wt(kg): --    GENERAL: NAD, well-developed  HEAD:  Atraumatic, Normocephalic  EYES: EOMI, PERRLA, conjunctiva and sclera clear  NECK: Supple, No JVD  CHEST/LUNG: Clear to auscultation bilaterally; No wheeze  HEART: Regular rate and rhythm; No murmurs, rubs, or gallops  ABDOMEN: Soft, Nontender, Nondistended; Bowel sounds present  EXTREMITIES:  2+ Peripheral Pulses, No clubbing, cyanosis, or edema  NEUROLOGY: non-focal  SKIN: No rashes or lesions                          13.2   8.50  )-----------( 66        01:20 )             41.9

## 2020-01-09 LAB
HCT VFR BLD CALC: 34.7 % — SIGNIFICANT CHANGE UP (ref 34.5–45)
HGB BLD-MCNC: 11.2 G/DL — LOW (ref 11.5–15.5)
MCHC RBC-ENTMCNC: 29.3 PG — SIGNIFICANT CHANGE UP (ref 27–34)
MCHC RBC-ENTMCNC: 32.3 % — SIGNIFICANT CHANGE UP (ref 32–36)
MCV RBC AUTO: 90.8 FL — SIGNIFICANT CHANGE UP (ref 80–100)
NRBC # FLD: 0 K/UL — SIGNIFICANT CHANGE UP (ref 0–0)
PLATELET # BLD AUTO: 69 K/UL — LOW (ref 150–400)
PMV BLD: 13.5 FL — HIGH (ref 7–13)
RBC # BLD: 3.82 M/UL — SIGNIFICANT CHANGE UP (ref 3.8–5.2)
RBC # FLD: 18.2 % — HIGH (ref 10.3–14.5)
WBC # BLD: 12.3 K/UL — HIGH (ref 3.8–10.5)
WBC # FLD AUTO: 12.3 K/UL — HIGH (ref 3.8–10.5)

## 2020-01-09 RX ADMIN — Medication 20 MILLIGRAM(S): at 15:03

## 2020-01-09 RX ADMIN — Medication 20 MILLIGRAM(S): at 07:01

## 2020-01-09 RX ADMIN — SODIUM CHLORIDE 3 MILLILITER(S): 9 INJECTION INTRAMUSCULAR; INTRAVENOUS; SUBCUTANEOUS at 14:59

## 2020-01-09 RX ADMIN — Medication 20 MILLIGRAM(S): at 23:13

## 2020-01-09 RX ADMIN — SODIUM CHLORIDE 3 MILLILITER(S): 9 INJECTION INTRAMUSCULAR; INTRAVENOUS; SUBCUTANEOUS at 06:58

## 2020-01-10 VITALS
RESPIRATION RATE: 17 BRPM | TEMPERATURE: 98 F | HEART RATE: 67 BPM | DIASTOLIC BLOOD PRESSURE: 61 MMHG | SYSTOLIC BLOOD PRESSURE: 114 MMHG | OXYGEN SATURATION: 96 %

## 2020-01-10 RX ORDER — IBUPROFEN 200 MG
1 TABLET ORAL
Qty: 0 | Refills: 0 | DISCHARGE
Start: 2020-01-10

## 2020-01-10 RX ADMIN — Medication 1 TABLET(S): at 11:27

## 2020-01-10 RX ADMIN — Medication 975 MILLIGRAM(S): at 10:21

## 2020-01-10 RX ADMIN — Medication 20 MILLIGRAM(S): at 06:32

## 2020-01-10 RX ADMIN — Medication 975 MILLIGRAM(S): at 09:31

## 2020-01-10 NOTE — DISCHARGE NOTE OB - PATIENT PORTAL LINK FT
You can access the FollowMyHealth Patient Portal offered by [facility] by registering at the following website: [email or web address]. By joining [facility]’s FollowMyHealth portal, you will also be able to view your health information using other applications (apps) compatible with our system.

## 2020-01-10 NOTE — DISCHARGE NOTE OB - CARE PROVIDERS DIRECT ADDRESSES
Chief Complaint   Patient presents with   • Elevated Hepatic Enzymes     AST: 48, ALT: 45     Mami Srivastava is a 71 y.o. female who presents with a History of elevated liver enzymes and an indeterminant hepatitis A antibody level   HPI     Patient 71-year-old female with history of hypertension and hypothyroidism found with mildly elevated LFTs.  Patient under evaluation found with an indeterminant hepatitis A antibody level.  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  Patient's liver enzymes however are minimal and not typical for hepatitis A infection.  Patient denies abdominal pain no nausea vomiting no flulike symptoms.  Patient denies jaundice or darkening of the urine or lightening of the stool.  Patient now here for further assessment.    Past Medical History:   Diagnosis Date   • Cholelithiasis 04-    Ultrasound   • Coronary artery disease     Dr Luis Rick   • Hernia 2017    Hiatal hernia-Prabhjot test   • Hyperlipidemia     Dr Luis Rick-atorvastatin   • Hypertension    • Hyperthyroidism    • Hypothyroidism    • Skin cancer     face   • Ulcerative colitis 2014    Colitis/Arben       Current Outpatient Prescriptions:   •  alendronate (FOSAMAX) 70 MG tablet, Take 1 tablet by mouth Every 7 (Seven) Days. For Osteopenia, Disp: 16 tablet, Rfl: 3  •  aspirin 81 MG EC tablet, Take 81 mg by mouth Daily., Disp: , Rfl:   •  cetirizine (zyrTEC) 10 MG tablet, Take 10 mg by mouth Daily., Disp: , Rfl:   •  levothyroxine (SYNTHROID, LEVOTHROID) 75 MCG tablet, Take 1 tablet by mouth Daily. For thyroid (generic accepted), Disp: 90 tablet, Rfl: 3  •  lisinopril-hydrochlorothiazide (PRINZIDE,ZESTORETIC) 10-12.5 MG per tablet, Take 1 tablet by mouth Daily. For BP, Disp: 90 tablet, Rfl: 1  •  Multiple Vitamin (MULTI VITAMIN DAILY PO), Take  by mouth., Disp: , Rfl:   •  atorvastatin (LIPITOR) 40 MG tablet, Take 1 tablet  by mouth Daily. For cholesterol, Disp: 90 tablet, Rfl: 3  Allergies   Allergen Reactions   • Augmentin [Amoxicillin-Pot Clavulanate] Hives   • Keflex [Cephalexin] Hives     Social History     Social History   • Marital status: Single     Spouse name: N/A   • Number of children: N/A   • Years of education: N/A     Occupational History   • Not on file.     Social History Main Topics   • Smoking status: Former Smoker     Packs/day: 1.50     Years: 10.00     Start date: 1963     Quit date: 1976   • Smokeless tobacco: Never Used      Comment: Chrinic bronchitis...smoke allergy   • Alcohol use Yes      Comment: Seldom   • Drug use: No   • Sexual activity: Not Currently     Other Topics Concern   • Not on file     Social History Narrative   • No narrative on file     Family History   Problem Relation Age of Onset   • Heart disease Mother    • Hypertension Mother    • Lung cancer Sister    • Thyroid disease Sister    • Lupus Sister    • Thyroid disease Brother    • Alcohol abuse Brother    • Glaucoma Maternal Grandmother    • Stomach cancer Maternal Grandmother          • Cirrhosis Father            Review of Systems   Constitutional: Negative.    HENT: Negative.    Eyes: Negative.    Respiratory: Negative.    Cardiovascular: Negative.    Gastrointestinal: Negative.    Endocrine: Negative.    Musculoskeletal: Negative.    Skin: Negative.    Allergic/Immunologic: Positive for environmental allergies. Negative for food allergies and immunocompromised state.   Hematological: Negative.      Vitals:    18 1605   BP: 142/82   Temp: 97.8 °F (36.6 °C)     Physical Exam   Constitutional: She is oriented to person, place, and time. She appears well-developed and well-nourished.   HENT:   Head: Normocephalic and atraumatic.   Eyes: Pupils are equal, round, and reactive to light. No scleral icterus.   Neck: Normal range of motion.   Cardiovascular: Normal rate, regular rhythm and normal heart sounds.   Exam reveals no gallop and no friction rub.    No murmur heard.  Pulmonary/Chest: Effort normal and breath sounds normal. She has no wheezes. She has no rales.   Abdominal: Soft. Bowel sounds are normal. She exhibits no shifting dullness, no distension, no pulsatile liver, no fluid wave, no abdominal bruit, no ascites, no pulsatile midline mass and no mass. There is no hepatosplenomegaly. There is no tenderness. There is no rigidity and no guarding. No hernia.   Musculoskeletal: Normal range of motion. She exhibits no edema.   Lymphadenopathy:     She has no cervical adenopathy.   Neurological: She is alert and oriented to person, place, and time. No cranial nerve deficit.   Skin: Skin is warm and dry. No rash noted.   Psychiatric: She has a normal mood and affect. Her behavior is normal.   Nursing note and vitals reviewed.    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  Currently patient not allowed to return to work until cleared the possibility of hepatitis A.  Patient reports no GI complaints denies any abdominal pain no right upper quadrant pain does have some left back pain and medication but unrelated to eating her bowels.  At this point will check total hepatitis A antibody levels as this may be a case where the the IgG is partially interacting with the IgM evaluation that would indicate patient is new and not infected.  We'll also check serologies for other causes of mild elevated liver test abnormalities and include hepatitis B antibody  as she may be interested in getting both a and B vaccine if she is not immune.  We'll follow up based on results of blood tests.   ,DirectAddress_Unknown

## 2020-01-10 NOTE — PROGRESS NOTE ADULT - SUBJECTIVE AND OBJECTIVE BOX
Patient assessed at 0906.  Subjective  Pain: Patient denies any pain at the time of assessment. Pain being managed well by pain management protocol  Complaints:  None. Patient denies any bruising. Patient reports she has been followed by hematologist due to ITP on prednisone.   Milestones: Alert and orientedx3. Out of bed ambulating. Voiding. Tolerating a regular diet.  Infant feeding:    breastfeeding and formula feeding                       Feeding related issues or concerns: none      Objective   T(C): 36.6 (01-10-20 @ 05:48), Max: 36.7 (20 @ 17:54)  HR: 67 (01-10-20 @ 05:48) (67 - 81)  BP: 114/61 (01-10-20 @ 05:48) (114/61 - 127/69)  RR: 17 (01-10-20 @ 05:48) (16 - 17)  SpO2: 96% (01-10-20 @ 05:48) (96% - 98%)  Wt(kg): --    LABS:                          11.2   12.30 )-----------( 69       ( 2020 06:00 )             34.7                         13.2   8.50  )-----------( 66       ( 2020 01:20 )             41.9                         12.3   7.23  )-----------( 64       ( 2020 14:45 )             38.0       Blood Type: B Positive      Treponema Pallidum Antibody Interpretation: Negative ( @ 01:20)  Treponema Pallidum Antibody Interpretation: Negative ( @ 14:45)      Rubella IgG Interp: Positive ( @ 01:20)     MEDICATIONS  (STANDING):  acetaminophen   Tablet .. 975 milliGRAM(s) Oral <User Schedule>  diphtheria/tetanus/pertussis (acellular) Vaccine (ADAcel) 0.5 milliLiter(s) IntraMuscular once  ibuprofen  Tablet. 600 milliGRAM(s) Oral every 6 hours  predniSONE   Tablet 20 milliGRAM(s) Oral three times a day  prenatal multivitamin 1 Tablet(s) Oral daily  sodium chloride 0.9% lock flush 3 milliLiter(s) IV Push every 8 hours    MEDICATIONS  (PRN):  benzocaine 20%/menthol 0.5% Spray 1 Spray(s) Topical every 6 hours PRN for Perineal discomfort  dibucaine 1% Ointment 1 Application(s) Topical every 6 hours PRN Perineal discomfort  diphenhydrAMINE 25 milliGRAM(s) Oral every 6 hours PRN Pruritus  glycerin Suppository - Adult 1 Suppository(s) Rectal at bedtime PRN Constipation  hydrocortisone 1% Cream 1 Application(s) Topical every 6 hours PRN Moderate Pain (4-6)  lanolin Ointment 1 Application(s) Topical every 6 hours PRN nipple soreness  magnesium hydroxide Suspension 30 milliLiter(s) Oral two times a day PRN Constipation  ondansetron Injectable 4 milliGRAM(s) IV Push every 6 hours PRN Nausea  oxyCODONE    IR 5 milliGRAM(s) Oral every 3 hours PRN Moderate to Severe Pain (4-10)  oxyCODONE    IR 5 milliGRAM(s) Oral once PRN Moderate to Severe Pain (4-10)  pramoxine 1%/zinc 5% Cream 1 Application(s) Topical every 4 hours PRN Moderate Pain (4-6)  simethicone 80 milliGRAM(s) Chew every 4 hours PRN Gas  witch hazel Pads 1 Application(s) Topical every 4 hours PRN Perineal discomfort      ASSESSMENT:   20y/o . Day #2  postpartum .  Past medical/surgical/OB/GYN history significant for ITP f/b hematologist Dr. Avila s/p prednisone [id number] discharge to home on prednisone.  Current Issues: EBL 150cc  Lung sound clear bilaterally  Breasts: soft, nontender  Nipples: slight flat nipples  Abdomen: Soft, nontender, nondistended. Fundus firm. Positive bowel sounds  Vagina: Lochia light rubra  Perineum:  Slight edema with no erythema noted  Laceration/episiotomy site: 2nd degree laceration  Lower extremities: No edema noted bilaterally and equal of lower extremities. Nontender calves.  No erythema noted. Positive pedal pulses. No palpable veins noted.  Other relevant physical exam findings: no bruising noted.       PLAN:  Follow up postpartum with hematologist due to history of ITP continue use of predisone as instructed by hematologist.   Continue routine postpartum care.   Increase ambulation, analgesia PRN and pain medication protocol standing oxycodone, ibuprofen and acetaminophen.  Continue regular diet  Discussed signs/symptoms to report due to history of ITP.  Discussed breast/nipple care and breastfeeding.   Discharge to home today. Discussed discharge planning.

## 2020-01-10 NOTE — DISCHARGE NOTE OB - CARE PLAN
Principal Discharge DX:	Vaginal delivery  Goal:	routine postpartum care  Assessment and plan of treatment:	follow up in 4 weeks / regular diet & activity as tolerate  Secondary Diagnosis:	Thrombocytopenia during pregnancy

## 2020-01-10 NOTE — DISCHARGE NOTE OB - CARE PROVIDER_API CALL
Baljinder Nath)  Obstetrics and Gynecology  [address] [place] [address], [address]  [place] [address]  Phone: [phone]  Fax: [phone]  Follow Up Time:

## 2020-01-10 NOTE — DISCHARGE NOTE OB - MEDICATION SUMMARY - MEDICATIONS TO TAKE
I will START or STAY ON the medications listed below when I get home from the hospital:    predniSONE 10 mg oral tablet  -- 2 tab(s) by mouth 3 times a day  -- Indication: For Thrombocytopenia during pregnancy    ibuprofen 600 mg oral tablet  -- 1 tab(s) by mouth every 6 hours  -- Indication: For  postpartum

## 2020-01-10 NOTE — PROGRESS NOTE ADULT - SUBJECTIVE AND OBJECTIVE BOX
Assessment and Plan    Day  2 Vaginal Delivery  She feels well  Continue the current pain medication  Encourage  Ambulation  Encourage regular diet   DVT ppx: SCDs only when not ambulating  She is stable, tolerates a diet and has normal flatus and bowel movements  She will be discharged on Day 2 according to the normal criteria.

## 2020-02-19 NOTE — OB RN PATIENT PROFILE - EDUCATION ON THE POTENTIAL RISKS AND IMPACT OF EARLY USE OF PACIFIERS ON THE ESTABLISHMENT OF BREASTFEEDING
Keep pain diary for 2 weeks following epidural steroid injection.    Avoid prolonged periods of rest.    We will reach out to Dr. Garcia's office to discuss further imaging of suspicious adrenal gland nodule incidentally found on MRI.   Statement Selected

## 2020-10-27 NOTE — OB PROVIDER H&P - NSSCHADMREASON_OBGYN_A_OB
Induction Non-Graft Cartilage Fenestration Text: The cartilage was fenestrated with a 2mm punch biopsy to help facilitate healing.

## 2021-07-29 NOTE — DISCHARGE NOTE OB - INFLUENZA IMMUNIZATION DATE (APPROXIMATE):
FREE:[LAST:[Romeo],FIRST:[Jaime],PHONE:[phone]],FAX:[(   [phone]    -],ADDRESS:[[address] [place] [address]],SCHEDULEDAPPT:[08/23/2021]]
04-Nov-2019

## 2022-05-14 ENCOUNTER — EMERGENCY (EMERGENCY)
Age: 22
LOS: 1 days | Discharge: HOME | End: 2022-05-14
Attending: PEDIATRICS | Admitting: PEDIATRICS
Payer: MEDICAID

## 2022-05-14 VITALS
HEART RATE: 83 BPM | RESPIRATION RATE: 18 BRPM | OXYGEN SATURATION: 100 % | TEMPERATURE: 98 F | SYSTOLIC BLOOD PRESSURE: 105 MMHG | HEIGHT: 55 IN | DIASTOLIC BLOOD PRESSURE: 78 MMHG

## 2022-05-14 PROBLEM — O99.119 OTHER DISEASES OF THE BLOOD AND BLOOD-FORMING ORGANS AND CERTAIN DISORDERS INVOLVING THE IMMUNE MECHANISM COMPLICATING PREGNANCY, UNSPECIFIED TRIMESTER: Chronic | Status: ACTIVE | Noted: 2020-01-07

## 2022-05-14 PROCEDURE — 99283 EMERGENCY DEPT VISIT LOW MDM: CPT

## 2022-05-14 RX ORDER — PERMETHRIN CREAM 5% W/W 50 MG/G
1 CREAM TOPICAL
Qty: 30 | Refills: 2
Start: 2022-05-14 | End: 2022-05-16

## 2022-05-14 NOTE — ED PROVIDER NOTE - CLINICAL SUMMARY MEDICAL DECISION MAKING FREE TEXT BOX
22 year old F presents to the ED with additional 7 family members with similar symptoms c/o rash x 1 month. Not responsive to steroids. Evaluated by dermatologist one month ago. Possible bed bugs vs scabies. Will treat with Permethrin and follow up with scheduled dermatologist appointment. Will re-dose if needed in one week. Multiple refill sent to family.

## 2022-05-14 NOTE — ED PROVIDER NOTE - NSFOLLOWUPINSTRUCTIONS_ED_ALL_ED_FT
Apply and massage in cream from head to toe (average adult requires 30 g); leave on for 8 to 14 hours before washing off with water; may reapply in 7days. follow up dermatology.

## 2022-05-14 NOTE — ED PROVIDER NOTE - PHYSICAL EXAMINATION
Multiple papular macular lesions on the trunk, arms, back and underneath the axilla. Some excoriated areas.

## 2022-05-14 NOTE — ED PROVIDER NOTE - PATIENT PORTAL LINK FT
You can access the FollowMyHealth Patient Portal offered by [facility] by registering at the following website: [email or web address]. By joining [facility]’s FollowMyHealth portal, you will also be able to view your health information using other applications (apps) compatible with our system.

## 2022-05-14 NOTE — ED PROVIDER NOTE - OBJECTIVE STATEMENT
22 year old F presents to the ED with 7 family members with similar symptoms c/o rashes. Additional family members at  home have the rash as well. Family went to the dermatologist one month ago and were prescribed triamcinolone and hydrocortisone with no relief.  No concerns for bed bugs as per Bambi. They have a history of bed bugs in previous home settings. They do not live in a shelter, they live in a rented apartment. Discussed with Bambi and the family to discuss with the landlord about the concern. Denies any pets in the home. Denies any URI symptoms. 22 year old F presents to the ED with 7 family members with similar symptoms c/o rashes. Bambi spoke on behalf of family and also presents with similar rash. Additional family members at  home have the rash as well. Family went to the dermatologist one month ago and were prescribed triamcinolone and hydrocortisone with no relief.  No concerns for bed bugs as per Bambi. They have a history of bed bugs in previous home settings. They do not live in a shelter, they live in a rented apartment. Discussed with Bambi and the family to discuss with the landlord about the concern. Denies any pets in the home. Denies any URI symptoms. Denies fever or recent travel.

## 2022-05-14 NOTE — ED PROVIDER NOTE - NSICDXPASTMEDICALHX_GEN_ALL_CORE_FT
PATIENT WAS SEEM 8/18/2021. MEDICATION NEEDS TO BE SENT TO [facility] MAIL ORDER.   
PAST MEDICAL HISTORY:  No pertinent past medical history     Thrombocytopenia during pregnancy

## 2022-05-17 NOTE — ED ADULT NURSE REASSESSMENT NOTE - NS ED NURSE REASSESS COMMENT FT1
pt called and confirmed was seen, treated and d/c with family from [facility] ED 3 days ago. Never brought to adult ED

## 2025-03-31 NOTE — ED PROVIDER NOTE - PROGRESS NOTE DETAILS
Time-based billing (NON-critical care)
Emi: pt yung dout to me by Dr Ghosh pending TVUS. + live IUP on US. will d.c home.